# Patient Record
Sex: MALE | Race: WHITE | Employment: FULL TIME | URBAN - METROPOLITAN AREA
[De-identification: names, ages, dates, MRNs, and addresses within clinical notes are randomized per-mention and may not be internally consistent; named-entity substitution may affect disease eponyms.]

---

## 2023-08-21 ENCOUNTER — APPOINTMENT (OUTPATIENT)
Dept: CT IMAGING | Age: 58
End: 2023-08-21
Payer: COMMERCIAL

## 2023-08-21 ENCOUNTER — HOSPITAL ENCOUNTER (EMERGENCY)
Age: 58
Discharge: HOME | End: 2023-08-21
Attending: STUDENT IN AN ORGANIZED HEALTH CARE EDUCATION/TRAINING PROGRAM
Payer: COMMERCIAL

## 2023-08-21 ENCOUNTER — APPOINTMENT (OUTPATIENT)
Dept: GENERAL RADIOLOGY | Age: 58
End: 2023-08-21
Payer: COMMERCIAL

## 2023-08-21 VITALS
DIASTOLIC BLOOD PRESSURE: 98 MMHG | WEIGHT: 189.1 LBS | HEART RATE: 100 BPM | OXYGEN SATURATION: 98 % | TEMPERATURE: 98.4 F | BODY MASS INDEX: 27.07 KG/M2 | HEIGHT: 70 IN | SYSTOLIC BLOOD PRESSURE: 125 MMHG | RESPIRATION RATE: 16 BRPM

## 2023-08-21 DIAGNOSIS — N17.9 ACUTE KIDNEY INJURY (HCC): ICD-10-CM

## 2023-08-21 DIAGNOSIS — R55 SYNCOPE AND COLLAPSE: Primary | ICD-10-CM

## 2023-08-21 LAB
ANION GAP SERPL CALCULATED.3IONS-SCNC: 13 MMOL/L (ref 3–16)
BASOPHILS # BLD: 0 K/UL (ref 0–0.2)
BASOPHILS NFR BLD: 0.1 %
BUN SERPL-MCNC: 34 MG/DL (ref 7–20)
CALCIUM SERPL-MCNC: 9.1 MG/DL (ref 8.3–10.6)
CHLORIDE SERPL-SCNC: 100 MMOL/L (ref 99–110)
CO2 SERPL-SCNC: 24 MMOL/L (ref 21–32)
CREAT SERPL-MCNC: 1.6 MG/DL (ref 0.8–1.3)
DEPRECATED RDW RBC AUTO: 13.6 % (ref 12.4–15.4)
EKG ATRIAL RATE: 95 BPM
EKG DIAGNOSIS: NORMAL
EKG P AXIS: 54 DEGREES
EKG P-R INTERVAL: 158 MS
EKG Q-T INTERVAL: 336 MS
EKG QRS DURATION: 94 MS
EKG QTC CALCULATION (BAZETT): 422 MS
EKG R AXIS: 14 DEGREES
EKG T AXIS: 59 DEGREES
EKG VENTRICULAR RATE: 95 BPM
EOSINOPHIL # BLD: 0 K/UL (ref 0–0.6)
EOSINOPHIL NFR BLD: 0.1 %
GFR SERPLBLD CREATININE-BSD FMLA CKD-EPI: 49 ML/MIN/{1.73_M2}
GLUCOSE SERPL-MCNC: 102 MG/DL (ref 70–99)
HCT VFR BLD AUTO: 43.7 % (ref 40.5–52.5)
HGB BLD-MCNC: 15.3 G/DL (ref 13.5–17.5)
LYMPHOCYTES # BLD: 3.4 K/UL (ref 1–5.1)
LYMPHOCYTES NFR BLD: 37 %
MCH RBC QN AUTO: 34.2 PG (ref 26–34)
MCHC RBC AUTO-ENTMCNC: 35 G/DL (ref 31–36)
MCV RBC AUTO: 97.7 FL (ref 80–100)
MONOCYTES # BLD: 0.9 K/UL (ref 0–1.3)
MONOCYTES NFR BLD: 10 %
NEUTROPHILS # BLD: 4.8 K/UL (ref 1.7–7.7)
NEUTROPHILS NFR BLD: 52.8 %
PLATELET # BLD AUTO: 175 K/UL (ref 135–450)
PMV BLD AUTO: 7.5 FL (ref 5–10.5)
POTASSIUM SERPL-SCNC: 4.2 MMOL/L (ref 3.5–5.1)
RBC # BLD AUTO: 4.48 M/UL (ref 4.2–5.9)
SODIUM SERPL-SCNC: 137 MMOL/L (ref 136–145)
WBC # BLD AUTO: 9.2 K/UL (ref 4–11)

## 2023-08-21 PROCEDURE — 80048 BASIC METABOLIC PNL TOTAL CA: CPT

## 2023-08-21 PROCEDURE — 70450 CT HEAD/BRAIN W/O DYE: CPT

## 2023-08-21 PROCEDURE — 85025 COMPLETE CBC W/AUTO DIFF WBC: CPT

## 2023-08-21 PROCEDURE — 6360000004 HC RX CONTRAST MEDICATION: Performed by: STUDENT IN AN ORGANIZED HEALTH CARE EDUCATION/TRAINING PROGRAM

## 2023-08-21 PROCEDURE — 70498 CT ANGIOGRAPHY NECK: CPT

## 2023-08-21 PROCEDURE — 93005 ELECTROCARDIOGRAM TRACING: CPT | Performed by: STUDENT IN AN ORGANIZED HEALTH CARE EDUCATION/TRAINING PROGRAM

## 2023-08-21 PROCEDURE — 71045 X-RAY EXAM CHEST 1 VIEW: CPT

## 2023-08-21 PROCEDURE — 99285 EMERGENCY DEPT VISIT HI MDM: CPT

## 2023-08-21 RX ADMIN — IOPAMIDOL 75 ML: 755 INJECTION, SOLUTION INTRAVENOUS at 17:04

## 2023-08-21 ASSESSMENT — ENCOUNTER SYMPTOMS
SHORTNESS OF BREATH: 0
ABDOMINAL PAIN: 0
PHOTOPHOBIA: 0
VOMITING: 0
BACK PAIN: 0
NAUSEA: 0
COUGH: 0

## 2023-08-21 NOTE — PLAN OF CARE
I was notified by radiology that patient's creatinine is just above the cutoff for contrast use. I believe that the CT scan is indicated and necessary given his neurologic findings and my concern for possible arterial dissection or occlusion. Benefits of study outweigh the theoretical risk of contrast associated acute kidney injury.

## 2023-08-21 NOTE — DISCHARGE INSTRUCTIONS
Please talk to your primary care doctor about this episode. This could have been because your blood pressure was low. We also found evidence that your kidney function has worsened slightly. You may need to cut back on some of your medications. You should have this lab rechecked in the next month.

## 2023-08-21 NOTE — ED NOTES
Patient discharged to home via family. Written discharge instructions reviewed with understanding. Copy of AVS sent home with patient. Patient able to walk from ED without assistance.         Delmar Merritt RN  08/21/23 [id number]